# Patient Record
Sex: MALE | Race: WHITE | NOT HISPANIC OR LATINO | Employment: UNEMPLOYED | URBAN - METROPOLITAN AREA
[De-identification: names, ages, dates, MRNs, and addresses within clinical notes are randomized per-mention and may not be internally consistent; named-entity substitution may affect disease eponyms.]

---

## 2022-12-30 ENCOUNTER — OFFICE VISIT (OUTPATIENT)
Dept: URGENT CARE | Facility: CLINIC | Age: 57
End: 2022-12-30

## 2022-12-30 VITALS
RESPIRATION RATE: 18 BRPM | HEIGHT: 64 IN | TEMPERATURE: 99 F | OXYGEN SATURATION: 98 % | BODY MASS INDEX: 20.29 KG/M2 | SYSTOLIC BLOOD PRESSURE: 171 MMHG | DIASTOLIC BLOOD PRESSURE: 92 MMHG | WEIGHT: 118.88 LBS | HEART RATE: 74 BPM

## 2022-12-30 DIAGNOSIS — U07.1 COVID: Primary | ICD-10-CM

## 2022-12-30 DIAGNOSIS — R68.89 FLU-LIKE SYMPTOMS: ICD-10-CM

## 2022-12-30 DIAGNOSIS — R11.0 NAUSEA: ICD-10-CM

## 2022-12-30 DIAGNOSIS — Z11.52 ENCOUNTER FOR SCREENING FOR SEVERE ACUTE RESPIRATORY SYNDROME CORONAVIRUS 2 (SARS-COV-2) INFECTION: ICD-10-CM

## 2022-12-30 LAB
CTP QC/QA: YES
CTP QC/QA: YES
FLUAV AG NPH QL: NEGATIVE
FLUBV AG NPH QL: NEGATIVE
SARS-COV-2 RDRP RESP QL NAA+PROBE: POSITIVE

## 2022-12-30 PROCEDURE — 87804 INFLUENZA ASSAY W/OPTIC: CPT | Mod: PBBFAC

## 2022-12-30 PROCEDURE — 99213 PR OFFICE/OUTPT VISIT, EST, LEVL III, 20-29 MIN: ICD-10-PCS | Mod: S$PBB,,,

## 2022-12-30 PROCEDURE — 87635 SARS-COV-2 COVID-19 AMP PRB: CPT | Mod: PBBFAC

## 2022-12-30 PROCEDURE — 99213 OFFICE O/P EST LOW 20 MIN: CPT | Mod: S$PBB,,,

## 2022-12-30 PROCEDURE — 99204 OFFICE O/P NEW MOD 45 MIN: CPT | Mod: PBBFAC

## 2022-12-30 RX ORDER — LORATADINE 10 MG/1
10 TABLET ORAL DAILY
Qty: 30 TABLET | Refills: 0 | Status: SHIPPED | OUTPATIENT
Start: 2022-12-30 | End: 2023-01-29

## 2022-12-30 RX ORDER — PROMETHAZINE HYDROCHLORIDE AND DEXTROMETHORPHAN HYDROBROMIDE 6.25; 15 MG/5ML; MG/5ML
5 SYRUP ORAL EVERY 6 HOURS PRN
Qty: 118 ML | Refills: 0 | Status: SHIPPED | OUTPATIENT
Start: 2022-12-30 | End: 2023-01-09

## 2022-12-30 RX ORDER — FLUTICASONE PROPIONATE 50 MCG
2 SPRAY, SUSPENSION (ML) NASAL DAILY
Qty: 18.2 ML | Refills: 0 | Status: SHIPPED | OUTPATIENT
Start: 2022-12-30

## 2022-12-30 RX ORDER — BENZONATATE 200 MG/1
200 CAPSULE ORAL 3 TIMES DAILY PRN
Qty: 30 CAPSULE | Refills: 0 | Status: SHIPPED | OUTPATIENT
Start: 2022-12-30 | End: 2023-01-09

## 2022-12-30 RX ORDER — ONDANSETRON 4 MG/1
4 TABLET, ORALLY DISINTEGRATING ORAL EVERY 8 HOURS PRN
Qty: 10 TABLET | Refills: 0 | Status: SHIPPED | OUTPATIENT
Start: 2022-12-30

## 2022-12-30 NOTE — LETTER
December 30, 2022      [facility]  [facility] [address]  Phone: [phone]       Patient: Dalton Mathew   YOB: 1965  Date of Visit: 12/30/2022    To Whom It May Concern:    Marcelle Mathew  was at [facility] on 12/30/2022. The patient may return to work/school on 1/5/23. If you have any questions or concerns, or if I can be of further assistance, please do not hesitate to contact me.    Sincerely,    NAS Melendrez NP

## 2022-12-30 NOTE — PROGRESS NOTES
"Subjective:       Patient ID: Dalton Mathew is a 57 y.o. male.    Vitals:  height is 5' 4" (1.626 m) and weight is 53.9 kg (118 lb 14.4 oz). His oral temperature is 98.6 °F (37 °C). His blood pressure is 171/92 (abnormal) and his pulse is 74. His respiration is 18 and oxygen saturation is 98%.     Chief Complaint: Headache (C/o headache and burning/pain from head to feet. States 2 days ago was "coughing up blood" ), Generalized Body Aches, Nasal Congestion, and Nausea    Pt states HA, cough, nasal congestion, nausea and burning in legs for the last 2 days.     Headache   Associated symptoms include coughing, nausea and a sore throat.   Nausea  Associated symptoms include congestion, coughing, headaches, nausea and a sore throat.     Constitution: Negative.   HENT:  Positive for congestion and sore throat.    Neck: neck negative.   Cardiovascular: Negative.    Eyes: Negative.    Respiratory:  Positive for cough.    Gastrointestinal:  Positive for nausea.   Genitourinary: Negative.    Musculoskeletal: Negative.    Skin: Negative.    Neurological:  Positive for headaches.     Objective:      Physical Exam   Constitutional: He is oriented to person, place, and time. normal  HENT:   Head: Normocephalic.   Ears:   Right Ear: Tympanic membrane, external ear and ear canal normal.   Left Ear: Tympanic membrane, external ear and ear canal normal.   Nose: Congestion present.   Mouth/Throat: Uvula is midline, oropharynx is clear and moist and mucous membranes are normal. Mucous membranes are moist. Oropharynx is clear.   Eyes: Pupils are equal, round, and reactive to light.   Neck: Neck supple.   Cardiovascular: Normal rate, regular rhythm, normal heart sounds and normal pulses.   Pulmonary/Chest: Effort normal and breath sounds normal.   Abdominal: Normal appearance. Soft.   Musculoskeletal: Normal range of motion.         General: Normal range of motion.   Neurological: He is alert and oriented to person, place, and time. "   Skin: Skin is warm and dry.   Vitals reviewed.      Assessment:       1. COVID    2. Encounter for screening for severe acute respiratory syndrome coronavirus 2 (SARS-CoV-2) infection    3. Flu-like symptoms    4. Nausea            Plan:         COVID  -     fluticasone propionate (FLONASE) 50 mcg/actuation nasal spray; 2 sprays (100 mcg total) by Each Nostril route once daily.  Dispense: 18.2 mL; Refill: 0  -     loratadine (CLARITIN) 10 mg tablet; Take 1 tablet (10 mg total) by mouth once daily.  Dispense: 30 tablet; Refill: 0  -     benzonatate (TESSALON) 200 MG capsule; Take 1 capsule (200 mg total) by mouth 3 (three) times daily as needed for Cough.  Dispense: 30 capsule; Refill: 0  -     promethazine-dextromethorphan (PROMETHAZINE-DM) 6.25-15 mg/5 mL Syrp; Take 5 mLs by mouth every 6 (six) hours as needed.  Dispense: 118 mL; Refill: 0    Encounter for screening for severe acute respiratory syndrome coronavirus 2 (SARS-CoV-2) infection  -     POCT COVID-19 Rapid Screening  -     Ambulatory referral/consult to Family Practice    Flu-like symptoms  -     POCT Influenza A/B    Nausea  -     ondansetron (ZOFRAN-ODT) 4 MG TbDL; Take 1 tablet (4 mg total) by mouth every 8 (eight) hours as needed.  Dispense: 10 tablet; Refill: 0    Please drink plenty of fluids.  Please get plenty of rest.  Please return here or go to the Emergency Department for any concerns or worsening of condition.      Take over the counter Tylenol (Acetaminophen) and/or Motrin (Ibuprofen) as directed for control of pain and/or fever.  Please follow up with your primary care doctor.     ER precautions given, patient verbalized understanding.     Please see provided patient education for guidance.    Follow up with PCP or return to clinic if symptoms worsen or do not improve.

## 2024-09-25 ENCOUNTER — OFFICE VISIT (OUTPATIENT)
Dept: FAMILY MEDICINE | Facility: CLINIC | Age: 59
End: 2024-09-25
Payer: MEDICAID

## 2024-09-25 ENCOUNTER — HOSPITAL ENCOUNTER (OUTPATIENT)
Dept: RADIOLOGY | Facility: HOSPITAL | Age: 59
Discharge: HOME | End: 2024-09-25
Attending: STUDENT IN AN ORGANIZED HEALTH CARE EDUCATION/TRAINING PROGRAM
Payer: MEDICAID

## 2024-09-25 VITALS
WEIGHT: 113 LBS | BODY MASS INDEX: 19.29 KG/M2 | DIASTOLIC BLOOD PRESSURE: 63 MMHG | OXYGEN SATURATION: 96 % | SYSTOLIC BLOOD PRESSURE: 107 MMHG | HEIGHT: 64 IN | TEMPERATURE: 98 F | HEART RATE: 67 BPM

## 2024-09-25 DIAGNOSIS — Z00.00 WELLNESS EXAMINATION: Primary | ICD-10-CM

## 2024-09-25 DIAGNOSIS — Z12.11 ENCOUNTER FOR COLORECTAL CANCER SCREENING: ICD-10-CM

## 2024-09-25 DIAGNOSIS — M54.2 NECK PAIN: ICD-10-CM

## 2024-09-25 DIAGNOSIS — R53.82 CHRONIC FATIGUE: ICD-10-CM
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
## 2024-09-25 DIAGNOSIS — Z12.12 ENCOUNTER FOR COLORECTAL CANCER SCREENING: ICD-10-CM

## 2024-09-25 DIAGNOSIS — R39.9 LOWER URINARY TRACT SYMPTOMS (LUTS): ICD-10-CM

## 2024-09-25 DIAGNOSIS — F17.200 SMOKER: ICD-10-CM

## 2024-09-25 DIAGNOSIS — S09.90XA INJURY OF HEAD, INITIAL ENCOUNTER: ICD-10-CM

## 2024-09-25 DIAGNOSIS — J02.9 SORE THROAT: ICD-10-CM

## 2024-09-25 DIAGNOSIS — J44.9 CHRONIC OBSTRUCTIVE PULMONARY DISEASE, UNSPECIFIED COPD TYPE: ICD-10-CM

## 2024-09-25 DIAGNOSIS — R42 DIZZINESS: ICD-10-CM

## 2024-09-25 DIAGNOSIS — R53.83 FATIGUE, UNSPECIFIED TYPE: ICD-10-CM

## 2024-09-25 DIAGNOSIS — J43.8 OTHER EMPHYSEMA: ICD-10-CM

## 2024-09-25 LAB
ALBUMIN SERPL-MCNC: 2.6 G/DL (ref 3.5–5)
ALBUMIN/GLOB SERPL: 0.7 RATIO (ref 1.1–2)
ALP SERPL-CCNC: 78 UNIT/L (ref 40–150)
ALT SERPL-CCNC: 13 UNIT/L (ref 0–55)
ANION GAP SERPL CALC-SCNC: 8 MEQ/L
AST SERPL-CCNC: 16 UNIT/L (ref 5–34)
BACTERIA #/AREA URNS AUTO: ABNORMAL /HPF
BASOPHILS # BLD AUTO: 0.14 X10(3)/MCL
BASOPHILS NFR BLD AUTO: 1.2 %
BILIRUB SERPL-MCNC: 0.2 MG/DL
BILIRUB UR QL STRIP.AUTO: NEGATIVE
BUN SERPL-MCNC: 14.4 MG/DL (ref 8.4–25.7)
CALCIUM SERPL-MCNC: 9.1 MG/DL (ref 8.4–10.2)
CHLORIDE SERPL-SCNC: 106 MMOL/L (ref 98–107)
CHOLEST SERPL-MCNC: 145 MG/DL
CHOLEST/HDLC SERPL: 4 {RATIO} (ref 0–5)
CLARITY UR: CLEAR
CO2 SERPL-SCNC: 26 MMOL/L (ref 22–29)
COLOR UR AUTO: YELLOW
CREAT SERPL-MCNC: 1.13 MG/DL (ref 0.73–1.18)
CREAT/UREA NIT SERPL: 13
EOSINOPHIL # BLD AUTO: 1.34 X10(3)/MCL (ref 0–0.9)
EOSINOPHIL NFR BLD AUTO: 11.1 %
ERYTHROCYTE [DISTWIDTH] IN BLOOD BY AUTOMATED COUNT: 14.1 % (ref 11.5–17)
EST. AVERAGE GLUCOSE BLD GHB EST-MCNC: 108.3 MG/DL
GFR SERPLBLD CREATININE-BSD FMLA CKD-EPI: >60 ML/MIN/1.73/M2
GLOBULIN SER-MCNC: 3.6 GM/DL (ref 2.4–3.5)
GLUCOSE SERPL-MCNC: 160 MG/DL (ref 74–100)
GLUCOSE UR QL STRIP: NORMAL
HBA1C MFR BLD: 5.4 %
HCT VFR BLD AUTO: 39.4 % (ref 42–52)
HCV AB SERPL QL IA: NONREACTIVE
HDLC SERPL-MCNC: 35 MG/DL (ref 35–60)
HGB BLD-MCNC: 13.2 G/DL (ref 14–18)
HGB UR QL STRIP: NEGATIVE
HIV 1+2 AB+HIV1 P24 AG SERPL QL IA: NONREACTIVE
HYALINE CASTS #/AREA URNS LPF: ABNORMAL /LPF
IMM GRANULOCYTES # BLD AUTO: 0.06 X10(3)/MCL (ref 0–0.04)
IMM GRANULOCYTES NFR BLD AUTO: 0.5 %
KETONES UR QL STRIP: NEGATIVE
LDLC SERPL CALC-MCNC: 93 MG/DL (ref 50–140)
LEUKOCYTE ESTERASE UR QL STRIP: NEGATIVE
LYMPHOCYTES # BLD AUTO: 1.87 X10(3)/MCL (ref 0.6–4.6)
LYMPHOCYTES NFR BLD AUTO: 15.4 %
MCH RBC QN AUTO: 31.7 PG (ref 27–31)
MCHC RBC AUTO-ENTMCNC: 33.5 G/DL (ref 33–36)
MCV RBC AUTO: 94.5 FL (ref 80–94)
MONOCYTES # BLD AUTO: 0.97 X10(3)/MCL (ref 0.1–1.3)
MONOCYTES NFR BLD AUTO: 8 %
MUCOUS THREADS URNS QL MICRO: ABNORMAL /LPF
NEUTROPHILS # BLD AUTO: 7.74 X10(3)/MCL (ref 2.1–9.2)
NEUTROPHILS NFR BLD AUTO: 63.8 %
NITRITE UR QL STRIP: NEGATIVE
NRBC BLD AUTO-RTO: 0 %
PH UR STRIP: 6 [PH]
PLATELET # BLD AUTO: 360 X10(3)/MCL (ref 130–400)
PMV BLD AUTO: 10.5 FL (ref 7.4–10.4)
POTASSIUM SERPL-SCNC: 3.7 MMOL/L (ref 3.5–5.1)
PROT SERPL-MCNC: 6.2 GM/DL (ref 6.4–8.3)
PROT UR QL STRIP: ABNORMAL
PSA SERPL-MCNC: 0.65 NG/ML
RBC # BLD AUTO: 4.17 X10(6)/MCL (ref 4.7–6.1)
RBC #/AREA URNS AUTO: ABNORMAL /HPF
SODIUM SERPL-SCNC: 140 MMOL/L (ref 136–145)
SP GR UR STRIP.AUTO: 1.02 (ref 1–1.03)
SQUAMOUS #/AREA URNS LPF: ABNORMAL /HPF
STREP A PCR (OHS): NOT DETECTED
T4 FREE SERPL-MCNC: 0.92 NG/DL (ref 0.7–1.48)
TRIGL SERPL-MCNC: 87 MG/DL (ref 34–140)
TSH SERPL-ACNC: 2.41 UIU/ML (ref 0.35–4.94)
UROBILINOGEN UR STRIP-ACNC: ABNORMAL
VLDLC SERPL CALC-MCNC: 17 MG/DL
WBC # BLD AUTO: 12.12 X10(3)/MCL (ref 4.5–11.5)
WBC #/AREA URNS AUTO: ABNORMAL /HPF

## 2024-09-25 PROCEDURE — 83036 HEMOGLOBIN GLYCOSYLATED A1C: CPT | Performed by: STUDENT IN AN ORGANIZED HEALTH CARE EDUCATION/TRAINING PROGRAM

## 2024-09-25 PROCEDURE — 80061 LIPID PANEL: CPT | Performed by: STUDENT IN AN ORGANIZED HEALTH CARE EDUCATION/TRAINING PROGRAM

## 2024-09-25 PROCEDURE — 1159F MED LIST DOCD IN RCRD: CPT | Mod: CPTII,,, | Performed by: STUDENT IN AN ORGANIZED HEALTH CARE EDUCATION/TRAINING PROGRAM

## 2024-09-25 PROCEDURE — 36415 COLL VENOUS BLD VENIPUNCTURE: CPT | Performed by: STUDENT IN AN ORGANIZED HEALTH CARE EDUCATION/TRAINING PROGRAM

## 2024-09-25 PROCEDURE — 99215 OFFICE O/P EST HI 40 MIN: CPT | Mod: PBBFAC,25,PN | Performed by: STUDENT IN AN ORGANIZED HEALTH CARE EDUCATION/TRAINING PROGRAM

## 2024-09-25 PROCEDURE — 84443 ASSAY THYROID STIM HORMONE: CPT | Performed by: STUDENT IN AN ORGANIZED HEALTH CARE EDUCATION/TRAINING PROGRAM

## 2024-09-25 PROCEDURE — 99214 OFFICE O/P EST MOD 30 MIN: CPT | Mod: S$PBB,25,, | Performed by: STUDENT IN AN ORGANIZED HEALTH CARE EDUCATION/TRAINING PROGRAM

## 2024-09-25 PROCEDURE — 85025 COMPLETE CBC W/AUTO DIFF WBC: CPT | Performed by: STUDENT IN AN ORGANIZED HEALTH CARE EDUCATION/TRAINING PROGRAM

## 2024-09-25 PROCEDURE — 99386 PREV VISIT NEW AGE 40-64: CPT | Mod: 25,S$PBB,, | Performed by: STUDENT IN AN ORGANIZED HEALTH CARE EDUCATION/TRAINING PROGRAM

## 2024-09-25 PROCEDURE — 87651 STREP A DNA AMP PROBE: CPT | Performed by: STUDENT IN AN ORGANIZED HEALTH CARE EDUCATION/TRAINING PROGRAM

## 2024-09-25 PROCEDURE — 81001 URINALYSIS AUTO W/SCOPE: CPT | Performed by: STUDENT IN AN ORGANIZED HEALTH CARE EDUCATION/TRAINING PROGRAM

## 2024-09-25 PROCEDURE — 86803 HEPATITIS C AB TEST: CPT | Performed by: STUDENT IN AN ORGANIZED HEALTH CARE EDUCATION/TRAINING PROGRAM

## 2024-09-25 PROCEDURE — 3078F DIAST BP <80 MM HG: CPT | Mod: CPTII,,, | Performed by: STUDENT IN AN ORGANIZED HEALTH CARE EDUCATION/TRAINING PROGRAM

## 2024-09-25 PROCEDURE — 84153 ASSAY OF PSA TOTAL: CPT | Performed by: STUDENT IN AN ORGANIZED HEALTH CARE EDUCATION/TRAINING PROGRAM

## 2024-09-25 PROCEDURE — 3008F BODY MASS INDEX DOCD: CPT | Mod: CPTII,,, | Performed by: STUDENT IN AN ORGANIZED HEALTH CARE EDUCATION/TRAINING PROGRAM

## 2024-09-25 PROCEDURE — 99406 BEHAV CHNG SMOKING 3-10 MIN: CPT | Mod: S$PBB,,, | Performed by: STUDENT IN AN ORGANIZED HEALTH CARE EDUCATION/TRAINING PROGRAM

## 2024-09-25 PROCEDURE — 80053 COMPREHEN METABOLIC PANEL: CPT | Performed by: STUDENT IN AN ORGANIZED HEALTH CARE EDUCATION/TRAINING PROGRAM

## 2024-09-25 PROCEDURE — 84439 ASSAY OF FREE THYROXINE: CPT | Performed by: STUDENT IN AN ORGANIZED HEALTH CARE EDUCATION/TRAINING PROGRAM

## 2024-09-25 PROCEDURE — 3074F SYST BP LT 130 MM HG: CPT | Mod: CPTII,,, | Performed by: STUDENT IN AN ORGANIZED HEALTH CARE EDUCATION/TRAINING PROGRAM

## 2024-09-25 PROCEDURE — 3044F HG A1C LEVEL LT 7.0%: CPT | Mod: CPTII,,, | Performed by: STUDENT IN AN ORGANIZED HEALTH CARE EDUCATION/TRAINING PROGRAM

## 2024-09-25 PROCEDURE — 87389 HIV-1 AG W/HIV-1&-2 AB AG IA: CPT | Performed by: STUDENT IN AN ORGANIZED HEALTH CARE EDUCATION/TRAINING PROGRAM

## 2024-09-25 PROCEDURE — 72040 X-RAY EXAM NECK SPINE 2-3 VW: CPT | Mod: TC,PN

## 2024-09-25 RX ORDER — TAMSULOSIN HYDROCHLORIDE 0.4 MG/1
0.4 CAPSULE ORAL DAILY
Qty: 90 CAPSULE | Refills: 3 | Status: SHIPPED | OUTPATIENT
Start: 2024-09-25 | End: 2025-09-25

## 2024-09-25 RX ORDER — FLUTICASONE PROPIONATE 110 UG/1
1 AEROSOL, METERED RESPIRATORY (INHALATION) 2 TIMES DAILY
Qty: 12 G | Refills: 0 | Status: SHIPPED | OUTPATIENT
Start: 2024-09-25 | End: 2025-09-25

## 2024-09-25 RX ORDER — ALBUTEROL SULFATE 90 UG/1
2 INHALANT RESPIRATORY (INHALATION) EVERY 4 HOURS PRN
COMMUNITY
End: 2024-09-26 | Stop reason: SDUPTHER

## 2024-09-26 PROBLEM — F17.200 SMOKER: Status: ACTIVE | Noted: 2024-09-26

## 2024-09-26 PROBLEM — R53.82 CHRONIC FATIGUE: Status: ACTIVE | Noted: 2024-09-26

## 2024-09-26 PROBLEM — R39.9 LOWER URINARY TRACT SYMPTOMS (LUTS): Status: ACTIVE | Noted: 2024-09-26

## 2024-09-26 PROBLEM — S09.90XA HEAD INJURY: Status: ACTIVE | Noted: 2024-09-26

## 2024-09-26 PROBLEM — J43.8 OTHER EMPHYSEMA: Status: ACTIVE | Noted: 2024-09-26

## 2024-09-26 PROBLEM — R42 DIZZINESS: Status: ACTIVE | Noted: 2024-09-26

## 2024-09-26 PROBLEM — Z00.00 WELLNESS EXAMINATION: Status: ACTIVE | Noted: 2024-09-26

## 2024-09-26 RX ORDER — ALBUTEROL SULFATE 90 UG/1
2 INHALANT RESPIRATORY (INHALATION) EVERY 4 HOURS PRN
Qty: 18 G | Refills: 11 | Status: SHIPPED | OUTPATIENT
Start: 2024-09-26

## 2024-09-26 NOTE — ASSESSMENT & PLAN NOTE
Wheezing on exam adding Flovent 1 puff b.i.d.  Refilled albuterol rescue inhaler   Ordering PFTs  May need to increase further and add tiotropium

## 2024-09-26 NOTE — ASSESSMENT & PLAN NOTE
Patient reports dizziness upon standing at times this happens once per year   Patient also reports that he was told he had an issue with his heart 7 years ago but did not follow-up referring patient to Cardiology  ED precautions given

## 2024-09-26 NOTE — ASSESSMENT & PLAN NOTE
CT head ordered neuro exam benign  Also ordered x-rays of neck secondary to fall and continued neck pain

## 2024-09-26 NOTE — ASSESSMENT & PLAN NOTE
Spent 5 minutes discussing smoking cessation with patient   Declines pills or patches   Will refer to smoking cessation program  Encourage patient to pick a quit date

## 2024-09-26 NOTE — ASSESSMENT & PLAN NOTE
Unclear etiology of fatigue CBC, iron, B12  Patient admits not drinking enough water and only eating sporadically   Will continue to monitor

## 2024-09-26 NOTE — PROGRESS NOTES
Patient Name: Dalton Mathew     : 1965    MRN: [id number]     Subjective:     Patient ID: Dalton Mathew is a 59 y.o. male.    Chief Complaint:   Chief Complaint   Patient presents with    Establish Care     Patient complains of neck pain and some pain in his throat that has been going on for two days now. Bp 107/63        HPI: HPI  59-year-old male presents to establish care.   Patient has not had previous PCP in his in need of wellness exam and also has issues as stated as below    COPD  Patient reports that he was told he had COPD   Chart records do indicate that patient had an x-ray taken August of this year which shows that he does have flattening of the diaphragms with COPD  Reports that he often wheezes  Has only been prescribed an albuterol inhaler but states he no longer has this    Smoking  Smokes 1-1/2 packs per day states he knows that he should quit he just forgets to quit    Patient is also reporting at times when he stands up too fast he gets dizzy but states that this happens maybe once a year  Reports seeing a cardiologist 7 years ago and was told he had some type of arrhythmia but states he did not present for follow-up    Lower urinary tract symptoms  States that he often feels the urge to urinate but only will get a trickle of urine out  Has not had PSA screening    Patient is also complaining of a sore throat states that the relatives down the street had sore throats and possibly strep throat and would like to be tested    Fatigue  Is reporting fatigue where he has no energy admits that he drinks coffee all day very seldom drinks water and only eats sporadically stating he often feels he has to work and get things done    Also is reporting an injury that happened several weeks ago states he was trying to climb up on a trailer he pulled himself up in hit his head and fell to the ground states he felt dizzy and out of it when this happened but did not seek medical attention  States that his  "neck has been bothering him since this occurred   ROS:      ROS     12 point review of systems conducted, negative except as stated in the history of present illness. See HPI for details.    History:     Past Medical History:   Diagnosis Date    COPD (chronic obstructive pulmonary disease)         History reviewed. No pertinent surgical history.    No family history on file.     Social History     Tobacco Use    Smoking status: Every Day     Current packs/day: 1.50     Types: Cigarettes    Smokeless tobacco: Never   Substance and Sexual Activity    Alcohol use: Not Currently    Drug use: Yes     Types: Marijuana    Sexual activity: Yes       Current Outpatient Medications   Medication Instructions    albuterol (PROVENTIL/VENTOLIN HFA) 90 mcg/actuation inhaler 2 puffs, Inhalation, Every 4 hours PRN    fluticasone propionate (FLOVENT HFA) 110 mcg/actuation inhaler 1 puff, Inhalation, 2 times daily, Controller    tamsulosin (FLOMAX) 0.4 mg, Oral, Daily        Review of patient's allergies indicates:  No Known Allergies    Objective:     Visit Vitals  /63 (BP Location: Right arm, Patient Position: Sitting)   Pulse 67   Temp 97.5 °F (36.4 °C) (Oral)   Ht 5' 4" (1.626 m)   Wt 51.3 kg (113 lb)   SpO2 96%   BMI 19.40 kg/m²       Physical Examination:     Physical Exam    Lab Results:     Chemistry:  Lab Results   Component Value Date     09/25/2024    K 3.7 09/25/2024    BUN 14.4 09/25/2024    CREATININE 1.13 09/25/2024    EGFRNORACEVR >60 09/25/2024    GLUCOSE 160 (H) 09/25/2024    CALCIUM 9.1 09/25/2024    ALKPHOS 78 09/25/2024    LABPROT 6.2 (L) 09/25/2024    ALBUMIN 2.6 (L) 09/25/2024    AST 16 09/25/2024    ALT 13 09/25/2024    TSH 2.407 09/25/2024    DSUNSR3KSNU 0.92 09/25/2024    PSA 0.65 09/25/2024        Lab Results   Component Value Date    HGBA1C 5.4 09/25/2024        Hematology:  Lab Results   Component Value Date    WBC 12.12 (H) 09/25/2024    HGB 13.2 (L) 09/25/2024    HCT 39.4 (L) " 09/25/2024     09/25/2024       Lipid Panel:  Lab Results   Component Value Date    CHOL 145 09/25/2024    HDL 35 09/25/2024    LDL 93.00 09/25/2024    TRIG 87 09/25/2024    TOTALCHOLEST 4 09/25/2024        Urine:  Lab Results   Component Value Date    APPEARANCEUA Clear 09/25/2024    SGUA 1.025 09/25/2024    PROTEINUA Trace (A) 09/25/2024    KETONESUA Negative 09/25/2024    LEUKOCYTESUR Negative 09/25/2024    RBCUA 0-5 09/25/2024    WBCUA 0-5 09/25/2024    BACTERIA None Seen 09/25/2024    SQEPUA Trace (A) 09/25/2024    HYALINECASTS 0-2 (A) 09/25/2024        Assessment:          ICD-10-CM ICD-9-CM   1. Wellness examination  Z00.00 V70.0   2. Fatigue, unspecified type  R53.83 780.79   3. Lower urinary tract symptoms (LUTS)  R39.9 788.99   4. Sore throat  J02.9 462   5. Smoker  F17.200 305.1   6. Neck pain  M54.2 723.1   7. Chronic obstructive pulmonary disease, unspecified COPD type  J44.9 496   8. Fall, initial encounter  W19.XXXA E888.9   9. Other emphysema  J43.8 492.8   10. Dizziness  R42 780.4   11. Injury of head, initial encounter  S09.90XA 959.01   12. Encounter for colorectal cancer screening  Z12.11 V76.51    Z12.12 V76.41   13. Chronic fatigue  R53.82 780.79        Plan:     1. Wellness examination  Assessment & Plan:  Labs and health maintenance as below    Orders:  -     CBC Auto Differential; Future; Expected date: 09/25/2024  -     Comprehensive Metabolic Panel; Future; Expected date: 09/25/2024  -     Lipid Panel; Future; Expected date: 09/25/2024  -     TSH; Future; Expected date: 09/25/2024  -     Hemoglobin A1C; Future; Expected date: 09/25/2024  -     Urinalysis; Future; Expected date: 09/25/2024  -     T4, Free; Future; Expected date: 09/25/2024  -     Hepatitis C Antibody; Future; Expected date: 09/25/2024  -     HIV 1/2 Ag/Ab (4th Gen); Future; Expected date: 09/25/2024  -     SYPHILIS ANTIBODY (WITH REFLEX RPR); Future; Expected date: 09/25/2024  -     Iron and TIBC; Future; Expected  date: 09/25/2024  -     Vitamin B12; Future; Expected date: 09/25/2024    2. Fatigue, unspecified type  -     TSH; Future; Expected date: 09/25/2024  -     T4, Free; Future; Expected date: 09/25/2024  -     Hepatitis C Antibody; Future; Expected date: 09/25/2024  -     Iron and TIBC; Future; Expected date: 09/25/2024  -     Vitamin B12; Future; Expected date: 09/25/2024    3. Lower urinary tract symptoms (LUTS)  Assessment & Plan:  PSA   Flomax 0.4 mg capsule daily    Orders:  -     PSA, Screening; Future; Expected date: 09/25/2024  -     tamsulosin (FLOMAX) 0.4 mg Cap; Take 1 capsule (0.4 mg total) by mouth once daily.  Dispense: 90 capsule; Refill: 3    4. Sore throat  -     Strep Group A by PCR; Future; Expected date: 09/25/2024    5. Smoker  Assessment & Plan:  Spent 5 minutes discussing smoking cessation with patient   Declines pills or patches   Will refer to smoking cessation program  Encourage patient to pick a quit date    Orders:  -     CT Chest Lung Screening Low Dose; Future; Expected date: 09/25/2024  -     CT Chest Lung Screening Low Dose; Future; Expected date: 09/26/2024  -     Complete PFT w/ bronchodilator; Future  -     Ambulatory referral/consult to Smoking Cessation Program; Future; Expected date: 10/03/2024    6. Neck pain  -     X-Ray Cervical Spine AP And Lateral; Future; Expected date: 09/25/2024    7. Chronic obstructive pulmonary disease, unspecified COPD type  -     fluticasone propionate (FLOVENT HFA) 110 mcg/actuation inhaler; Inhale 1 puff into the lungs 2 (two) times daily. Controller  Dispense: 12 g; Refill: 0  -     Complete PFT w/ bronchodilator; Future  -     albuterol (PROVENTIL/VENTOLIN HFA) 90 mcg/actuation inhaler; Inhale 2 puffs into the lungs every 4 (four) hours as needed for Wheezing.  Dispense: 18 g; Refill: 11    8. Fall, initial encounter  -     CT Head Without Contrast; Future; Expected date: 09/26/2024    9. Other emphysema  Assessment & Plan:  Wheezing on exam adding  Flovent 1 puff b.i.d.  Refilled albuterol rescue inhaler   Ordering PFTs  May need to increase further and add tiotropium      10. Dizziness  Assessment & Plan:  Patient reports dizziness upon standing at times this happens once per year   Patient also reports that he was told he had an issue with his heart 7 years ago but did not follow-up referring patient to Cardiology  ED precautions given    Orders:  -     Ambulatory referral/consult to Cardiology; Future; Expected date: 10/03/2024    11. Injury of head, initial encounter  Assessment & Plan:  CT head ordered neuro exam benign  Also ordered x-rays of neck secondary to fall and continued neck pain      12. Encounter for colorectal cancer screening  -     Cologuard Screening (Multitarget Stool DNA); Future; Expected date: 09/26/2024    13. Chronic fatigue  Assessment & Plan:  Unclear etiology of fatigue CBC, iron, B12  Patient admits not drinking enough water and only eating sporadically   Will continue to monitor           Follow up in about 1 month (around 10/25/2024) for lab results; COPD .    Future Appointments   Date Time Provider Department Center   11/12/2024  2:00 PM Marlena Ortiz MD [facility]        Marlena Ortiz MD

## 2024-10-04 ENCOUNTER — HOSPITAL ENCOUNTER (OUTPATIENT)
Dept: PULMONOLOGY | Facility: HOSPITAL | Age: 59
Discharge: HOME | End: 2024-10-04
Attending: STUDENT IN AN ORGANIZED HEALTH CARE EDUCATION/TRAINING PROGRAM
Payer: MEDICAID

## 2024-10-04 VITALS — OXYGEN SATURATION: 96 % | RESPIRATION RATE: 18 BRPM | HEART RATE: 71 BPM

## 2024-10-04 DIAGNOSIS — F17.200 SMOKER: ICD-10-CM

## 2024-10-04 DIAGNOSIS — J43.8 OTHER EMPHYSEMA: Primary | ICD-10-CM

## 2024-10-04 DIAGNOSIS — J44.9 CHRONIC OBSTRUCTIVE PULMONARY DISEASE, UNSPECIFIED COPD TYPE: ICD-10-CM

## 2024-10-04 LAB
DLCO SINGLE BREATH LLN: 17.76
DLCO SINGLE BREATH PRE REF: 79.4 %
DLCO SINGLE BREATH REF: 24.69
DLCOC SBVA LLN: 2.75
DLCOC SBVA REF: 4.18
DLCOC SINGLE BREATH LLN: 17.76
DLCOC SINGLE BREATH REF: 24.69
DLCOVA LLN: 2.75
DLCOVA PRE REF: 95.8 %
DLCOVA PRE: 4.01 ML/(MIN*MMHG*L) (ref 2.75–5.61)
DLCOVA REF: 4.18
ERV LLN: -16448.93
ERV PRE REF: 66.8 %
ERV REF: 1.07
FEF 25 75 CHG: 107.1 %
FEF 25 75 LLN: 1.69
FEF 25 75 POST REF: 49.6 %
FEF 25 75 PRE REF: 23.9 %
FEF 25 75 REF: 3.4
FET100 CHG: -19 %
FEV1 CHG: 24.3 %
FEV1 FVC CHG: 18 %
FEV1 FVC LLN: 67
FEV1 FVC POST REF: 90.4 %
FEV1 FVC PRE REF: 76.6 %
FEV1 FVC REF: 79
FEV1 LLN: 2.29
FEV1 POST REF: 75.5 %
FEV1 PRE REF: 60.7 %
FEV1 REF: 3.01
FRCPLETH LLN: 2.24
FRCPLETH PREREF: 118.9 %
FRCPLETH REF: 3.23
FVC CHG: 5.4 %
FVC LLN: 2.93
FVC POST REF: 83.6 %
FVC PRE REF: 79.3 %
FVC REF: 3.81
IVC PRE: 3.06 L (ref 2.93–4.71)
IVC SINGLE BREATH LLN: 2.93
IVC SINGLE BREATH PRE REF: 80.2 %
IVC SINGLE BREATH REF: 3.81
MVV LLN: 97
MVV PRE REF: 64.8 %
MVV REF: 114
PEF CHG: 4.3 %
PEF LLN: 6.2
PEF POST REF: 46.6 %
PEF PRE REF: 44.7 %
PEF REF: 8.14
POST FEF 25 75: 1.69 L/S (ref 1.69–5.11)
POST FET 100: 6.28 SEC
POST FEV1 FVC: 71.41 % (ref 67.11–89.27)
POST FEV1: 2.27 L (ref 2.29–3.69)
POST FVC: 3.19 L (ref 2.93–4.71)
POST PEF: 3.79 L/S (ref 6.2–10.07)
PRE DLCO: 19.61 ML/(MIN*MMHG) (ref 17.76–31.62)
PRE ERV: 0.72 L (ref -16448.93–16451.07)
PRE FEF 25 75: 0.81 L/S (ref 1.69–5.11)
PRE FET 100: 7.75 SEC
PRE FEV1 FVC: 60.51 % (ref 67.11–89.27)
PRE FEV1: 1.83 L (ref 2.29–3.69)
PRE FRC PL: 3.84 L (ref 2.24–4.21)
PRE FVC: 3.02 L (ref 2.93–4.71)
PRE MVV: 73.82 L/MIN (ref 96.8–130.97)
PRE PEF: 3.64 L/S (ref 6.2–10.07)
PRE RV: 3.12 L (ref 1.48–2.83)
PRE TLC: 6.14 L (ref 4.76–7.06)
RAW LLN: 3.06
RAW PRE REF: 135.9 %
RAW PRE: 4.16 CMH2O*S/L (ref 3.06–3.06)
RAW REF: 3.06
RV LLN: 1.48
RV PRE REF: 144.8 %
RV REF: 2.15
RVTLC LLN: 27
RVTLC PRE REF: 140.3 %
RVTLC PRE: 50.78 % (ref 27.21–45.17)
RVTLC REF: 36
TLC LLN: 4.76
TLC PRE REF: 103.9 %
TLC REF: 5.91
VA PRE: 4.9 L (ref 5.76–5.76)
VA SINGLE BREATH LLN: 5.76
VA SINGLE BREATH PRE REF: 85 %
VA SINGLE BREATH REF: 5.76
VC LLN: 2.93
VC PRE REF: 79.3 %
VC PRE: 3.02 L (ref 2.93–4.71)
VC REF: 3.81

## 2024-10-04 PROCEDURE — 94060 EVALUATION OF WHEEZING: CPT

## 2024-10-04 PROCEDURE — 94640 AIRWAY INHALATION TREATMENT: CPT | Mod: XB

## 2024-10-04 PROCEDURE — 94726 PLETHYSMOGRAPHY LUNG VOLUMES: CPT

## 2024-10-04 PROCEDURE — 94729 DIFFUSING CAPACITY: CPT

## 2024-10-04 RX ORDER — ALBUTEROL SULFATE 2.5 MG/.5ML
2.5 SOLUTION RESPIRATORY (INHALATION) EVERY 4 HOURS PRN
Status: ACTIVE | OUTPATIENT
Start: 2024-10-04

## 2024-10-04 RX ADMIN — ALBUTEROL SULFATE 2.5 MG: 2.5 SOLUTION RESPIRATORY (INHALATION) at 01:10

## 2024-10-04 NOTE — PROGRESS NOTES
Good cooperation and effort given. Albuterol 2.5 mg given HR 71/74, SAT 96%, BBS wheezing  PFT completed

## 2024-10-22 ENCOUNTER — PATIENT MESSAGE (OUTPATIENT)
Dept: FAMILY MEDICINE | Facility: CLINIC | Age: 59
End: 2024-10-22
Payer: MEDICAID

## 2024-11-07 ENCOUNTER — OFFICE VISIT (OUTPATIENT)
Dept: FAMILY MEDICINE | Facility: CLINIC | Age: 59
End: 2024-11-07
Payer: MEDICAID

## 2024-11-07 VITALS
WEIGHT: 114 LBS | HEIGHT: 64 IN | OXYGEN SATURATION: 97 % | SYSTOLIC BLOOD PRESSURE: 116 MMHG | HEART RATE: 62 BPM | DIASTOLIC BLOOD PRESSURE: 69 MMHG | BODY MASS INDEX: 19.46 KG/M2 | TEMPERATURE: 98 F

## 2024-11-07 DIAGNOSIS — F17.200 SMOKER: ICD-10-CM

## 2024-11-07 DIAGNOSIS — J43.8 OTHER EMPHYSEMA: ICD-10-CM

## 2024-11-07 DIAGNOSIS — J44.9 CHRONIC OBSTRUCTIVE PULMONARY DISEASE, UNSPECIFIED COPD TYPE: Primary | ICD-10-CM

## 2024-11-07 DIAGNOSIS — R14.0 FLATULENCE/GAS PAIN/BELCHING: ICD-10-CM

## 2024-11-07 DIAGNOSIS — R07.89 ATYPICAL CHEST PAIN: ICD-10-CM

## 2024-11-07 PROCEDURE — 99213 OFFICE O/P EST LOW 20 MIN: CPT | Mod: PBBFAC,PN | Performed by: STUDENT IN AN ORGANIZED HEALTH CARE EDUCATION/TRAINING PROGRAM

## 2024-11-07 RX ORDER — SIMETHICONE 180 MG
1 CAPSULE ORAL DAILY PRN
Qty: 30 EACH | Refills: 6 | Status: SHIPPED | OUTPATIENT
Start: 2024-11-07

## 2024-11-07 NOTE — ASSESSMENT & PLAN NOTE
Spent 6 minutes discussing smoking cessation with patient   Encouraged patient to pick a quit date   Patient declines referral to smoking cessation   Declines pills or patches States he will remind himself to quit

## 2024-11-07 NOTE — PROGRESS NOTES
Patient Name: Dalton Mathew     : 1965    MRN: [id number]     Subjective:     Patient ID: Dalton Mathew is a 59 y.o. male.    Chief Complaint:   Chief Complaint   Patient presents with    Follow-up     Complains of chest pain and pain in the center of his back. This has been going on for about 2 weeks. /69        HPI: HPI  59-year-old male presents to [facility] care.   Patient has not had previous PCP in his in need of wellness exam and also has issues as stated as below    COPD  Patient reports that he was told he had COPD   Chart records do indicate that patient had an x-ray taken August of this year which shows that he does have flattening of the diaphragms with COPD  Copd revealed on low-dose lung cancer screening as well as pulmonary function testing  Smoking  Smokes 1-1/2 packs per day states he knows that he should quit he just forgets to quit    Patient is reporting that he is belching quite often is painful  Amenable to trying simethicone    Also reports previously that he had been worked up by Cardiology many years ago and was told that he had a heart problem but does not remember what it was is amenable to referral back    Lower urinary tract symptoms  States that he often feels the urge to urinate but only will get a trickle of urine out  Flomax 0.4 mg daily       ROS:    ROS:      ROS     12 point review of systems conducted, negative except as stated in the history of present illness. See HPI for details.    History:     Past Medical History:   Diagnosis Date    COPD (chronic obstructive pulmonary disease)         History reviewed. No pertinent surgical history.    No family history on file.     Social History     Tobacco Use    Smoking status: Every Day     Current packs/day: 1.50     Types: Cigarettes    Smokeless tobacco: Never   Substance and Sexual Activity    Alcohol use: Not Currently    Drug use: Yes     Types: Marijuana    Sexual activity: Yes       Current Outpatient Medications  "  Medication Instructions    albuterol (PROVENTIL/VENTOLIN HFA) 90 mcg/actuation inhaler 2 puffs, Inhalation, Every 4 hours PRN    fluticasone propionate (FLOVENT HFA) 110 mcg/actuation inhaler 1 puff, Inhalation, 2 times daily, Controller    simethicone 180 mg Cap 1 tablet, Oral, Daily PRN    tamsulosin (FLOMAX) 0.4 mg, Oral, Daily    tiotropium bromide (SPIRIVA RESPIMAT) 2.5 mcg/actuation inhaler 2 puffs, Inhalation, Daily, Controller        Review of patient's allergies indicates:  No Known Allergies    Objective:     Visit Vitals  /69 (BP Location: Right arm, Patient Position: Sitting)   Pulse 62   Temp 97.7 °F (36.5 °C) (Oral)   Ht 5' 4" (1.626 m)   Wt 51.7 kg (114 lb)   SpO2 97%   BMI 19.57 kg/m²       Physical Examination:     Physical Exam  Constitutional:       General: He is not in acute distress.     Appearance: Normal appearance. He is not ill-appearing or diaphoretic.   HENT:      Nose: No congestion.   Eyes:      Conjunctiva/sclera: Conjunctivae normal.      Pupils: Pupils are equal, round, and reactive to light.   Neck:      Vascular: Carotid bruit present.      Comments: Possible but faint carotid bruit on left  Cardiovascular:      Rate and Rhythm: Normal rate and regular rhythm.      Heart sounds: No murmur heard.  Pulmonary:      Effort: Pulmonary effort is normal. No respiratory distress.      Breath sounds: Wheezing present.   Musculoskeletal:         General: No swelling, tenderness, deformity or signs of injury. Normal range of motion.      Cervical back: Normal range of motion.   Skin:     General: Skin is warm and dry.      Coloration: Skin is not jaundiced.   Neurological:      General: No focal deficit present.      Mental Status: He is alert and oriented to person, place, and time. Mental status is at baseline.      Gait: Gait normal.         Lab Results:     Chemistry:  Lab Results   Component Value Date     09/25/2024    K 3.7 09/25/2024    BUN 14.4 09/25/2024    CREATININE " 1.13 09/25/2024    EGFRNORACEVR >60 09/25/2024    GLUCOSE 160 (H) 09/25/2024    CALCIUM 9.1 09/25/2024    ALKPHOS 78 09/25/2024    LABPROT 6.2 (L) 09/25/2024    ALBUMIN 2.6 (L) 09/25/2024    AST 16 09/25/2024    ALT 13 09/25/2024    TSH 2.407 09/25/2024    [id number] 0.92 09/25/2024    PSA 0.65 09/25/2024        Lab Results   Component Value Date    HGBA1C 5.4 09/25/2024        Hematology:  Lab Results   Component Value Date    WBC 12.12 (H) 09/25/2024    HGB 13.2 (L) 09/25/2024    HCT 39.4 (L) 09/25/2024     09/25/2024       Lipid Panel:  Lab Results   Component Value Date    CHOL 145 09/25/2024    HDL 35 09/25/2024    LDL 93.00 09/25/2024    TRIG 87 09/25/2024    TOTALCHOLEST 4 09/25/2024        Urine:  Lab Results   Component Value Date    APPEARANCEUA Clear 09/25/2024    SGUA 1.025 09/25/2024    PROTEINUA Trace (A) 09/25/2024    KETONESUA Negative 09/25/2024    LEUKOCYTESUR Negative 09/25/2024    RBCUA 0-5 09/25/2024    WBCUA 0-5 09/25/2024    BACTERIA None Seen 09/25/2024    SQEPUA Trace (A) 09/25/2024    HYALINECASTS 0-2 (A) 09/25/2024        Assessment:          ICD-10-CM ICD-9-CM   1. Chronic obstructive pulmonary disease, unspecified COPD type  J44.9 496   2. Atypical chest pain  R07.89 786.59   3. Smoker  F17.200 305.1   4. Flatulence/gas pain/belching  R14.0 787.3   5. Other emphysema  J43.8 492.8        Plan:     1. Chronic obstructive pulmonary disease, unspecified COPD type  -     tiotropium bromide (SPIRIVA RESPIMAT) 2.5 mcg/actuation inhaler; Inhale 2 puffs into the lungs Daily. Controller  Dispense: 4 g; Refill: 11    2. Atypical chest pain  -     Ambulatory referral/consult to Cardiology; Future; Expected date: 11/14/2024    3. Smoker  Assessment & Plan:  Spent 6 minutes discussing smoking cessation with patient   Encouraged patient to pick a quit date   Patient declines referral to smoking cessation   Declines pills or patches States he will remind himself to quit    Orders:  -      Ambulatory referral/consult to Cardiology; Future; Expected date: 11/14/2024    4. Flatulence/gas pain/belching  -     simethicone 180 mg Cap; Take 1 tablet by mouth daily as needed (gas).  Dispense: 30 each; Refill: 6    5. Other emphysema  Assessment & Plan:  Reviewed pulmonary function testing with patient showing COPD as well as low-dose lung cancer screening showing emphysematous changes    Adding tiotropium  Continuing Flovent and albuterol           Follow up in about 2 months (around 1/7/2025) for COPD.    No future appointments.       Marlena Ortiz MD

## 2024-11-07 NOTE — ASSESSMENT & PLAN NOTE
Reviewed pulmonary function testing with patient showing COPD as well as low-dose lung cancer screening showing emphysematous changes    Adding tiotropium  Continuing Flovent and albuterol

## 2024-11-14 ENCOUNTER — TELEPHONE (OUTPATIENT)
Dept: CARDIOLOGY | Facility: CLINIC | Age: 59
End: 2024-11-14
Payer: MEDICAID

## 2025-02-18 ENCOUNTER — TELEPHONE (OUTPATIENT)
Dept: FAMILY MEDICINE | Facility: CLINIC | Age: 60
End: 2025-02-18
Payer: MEDICAID

## 2025-02-18 NOTE — TELEPHONE ENCOUNTER
----- Message from [place] sent at 2/17/2025  3:24 PM CST -----  Regarding: hosp f/u  Who Called: Dalton Camarillo is requesting assistance/information from provider's office.Symptoms (please be specific):  How long has patient had these symptoms:  List of preferred pharmacies on file (remove unneeded): [unfilled]If different, enter pharmacy into here including location and phone number: Preferred Method of Contact: Phone CallPatient's Preferred Phone Number on File: [phone] Best Call Back Number, if different:Additional Information: pt needs hosp f/u discharged from [facility] 2/17/25